# Patient Record
Sex: FEMALE | Employment: OTHER | URBAN - METROPOLITAN AREA
[De-identification: names, ages, dates, MRNs, and addresses within clinical notes are randomized per-mention and may not be internally consistent; named-entity substitution may affect disease eponyms.]

---

## 2022-02-24 ENCOUNTER — HOSPITAL ENCOUNTER (OUTPATIENT)
Dept: PHYSICAL THERAPY | Age: 22
Discharge: HOME | End: 2022-02-24
Payer: OTHER GOVERNMENT

## 2022-02-24 PROCEDURE — 97530 THERAPEUTIC ACTIVITIES: CPT

## 2022-02-24 PROCEDURE — 97112 NEUROMUSCULAR REEDUCATION: CPT

## 2022-02-24 PROCEDURE — 97161 PT EVAL LOW COMPLEX 20 MIN: CPT

## 2022-02-24 NOTE — PROGRESS NOTES
[facility]  [place] [email or web address]  [address] [place] [address]  [phone] fax    Plan of Care/ Statement of Necessity for Physical Therapy Services    Patient name: Mohit Regalado Start of Care: 2022   Referral source: Thad Peace,* : 2000    Medical Diagnosis: Pelvic floor dysfunction [M62.89]  Urge incontinence [N39.41]  Payor: [facility] / Plan: [facility] / Product Type: Veleta Del /  Onset Date: Aug 2021    Treatment Diagnosis: PFD, abdominal pain, Urge UI   Prior Hospitalization: see medical history Provider#: [id number]   Medications: Verified on Patient summary List    Comorbidities: alcohol use   Prior Level of Function: ind with all mobility, heavy lifting at work (90 lbs repetitively throughout the day)           The Plan of Care and following information is based on the information from the initial evaluation. Assessment/ key information: Ms. Mohit Regalado is a 20 y/o, F who present with c/o PFD, abdominal pain, and urge UI. Pain/problem started in , aggravated significantly in Aug 2021. Pt recalled feet surgery in Mar 2021. Pain locates at lower abdominal region and pelvic/vulva region. Pain aggravated with urgency, lifting, and squatting (which are required for job). No pain with internal assessment and during sexual intercourse; mod-max pain after though. Pt reports difficulty with maintaining optimal voiding interval and mechanics (due to job duty). Pt reports few drop leakage with changing position and strong urgency (delaying voiding bladder more than 4 hour). Pt needs to void bladder every 1 hour during off day/when she's at home. Pt reports no significant problem with bowel.  Evaluation reveals patient with musculoskeletal screen TTP along lower abdominal region, worst with bladder region, poor strength and max challenge with relaxation of PFM. No instability or trigger points noted.  Patient may benefit from physical therapy to address these limitations to improve her QOL. Evaluation Complexity History MEDIUM  Complexity : 1-2 comorbidities / personal factors will impact the outcome/ POC ; Examination LOW Complexity : 1-2 Standardized tests and measures addressing body structure, function, activity limitation and / or participation in recreation  ;Presentation LOW Complexity : Stable, uncomplicated  ;Clinical Decision Making MEDIUM Complexity : FOTO score of 26-74  Overall Complexity Rating: LOW     Problem List: Pelvic pain/dysfunction, Decreased pelvic floor mm awareness, Decreased pelvic floor mm strength, Use of accessory muscles, Improper voiding habits, Hypertonus of pelvic floor and Urinary urgency    Treatment Plan may include any combination of the following:   Therapeutic exercise, Urge suppression techniques, Neuromuscular re-education, Manual therapy, Physical agent/modality and Patient education  Patient / Family readiness to learn indicated by: asking questions, trying to perform skills and interest    Persons(s) to be included in education: patient (P)    Barriers to Learning/Limitations: None    Patient Goal (s): better help with dealing with pelvic discomfort/pain    Patient Self Reported Health Status: good    Rehabilitation Potential: good      Short Term Goals: To be accomplished in 4 weeks:  1. Patient will demonstrate accurate performance of home exercise program/pelvic floor contractions as adjunct to physical therapy clinic visits to promote healthy lifestyle and improved quality of life. Eval status: will review next visits     2. Patient will Complete Bladder Diary for use in patient education and goal setting. Eval status: will be given for monitoring     3. Patient will demonstrate 2-3 hour voiding interval with no leakage. Eval status: 1-4+ hour voiding interval depending on work & personal schedule     4.  Patient will report at least 25% improvement with pain to improve her QOL.  Eval status: 3-9/10, worst with delaying viding bladder, squatting/lifting      5. Patient will demonstrate good mechanics with squatting and lifting to improve ease & safety with job duty. Eval status: good understanding     Long Term Goals: To be accomplished in 8  weeks:  1. Patient will demonstrate independence in HEP for maintenance of pelvic floor program and improved quality of life. Eval status: will review next visit     2. Patient will report at least 60% improvement with ease for voiding bladder to improve her QOL. Eval status: required extra time, urgency return 10 min after voiding     3. Patient will report at least 60% improvement with pain to improve her QOL. Eval status: 3-9/10, worst with delaying viding bladder, squatting/lifting      4. Patient will have increased pelvic floor muscle motor performance by 1/2 to 1 grade for improved urinary continence and quality of life. Eval status: 1/5     5. Patient will have FOTO Urinary Problem & PFDI pain score change of 16 points or more indicating improvement in function for icreased quality of life. Eval status: 55 & 29 respectively       Frequency / Duration: Patient to be seen 1-2 times per week for 8 weeks. Patient/ Caregiver education and instruction: Diagnosis, prognosis, Proper Voiding Habits, Diet, Pain Management, Exercises and Bladder Retraining      Jaleesa Valenzuela  2/24/2022 9:45 AM    ________________________________________________________________________    I certify that the above Therapy Services are being furnished while the patient is under my care. I agree with the treatment plan and certify that this therapy is necessary.     Physician's Signature:____________Date:_________TIME:________     Francesco Quispe,*  ** Signature, Date and Time must be completed for valid certification **      Please sign and return to [facility] [employer] [facility]  [facility]   [address] [place] [address]  [phone] fax

## 2022-02-24 NOTE — PROGRESS NOTES
PF Daily Treatment Note  Patient Name: Mohit Regalado  Date:2022  []  Patient  Verified  Insurance:Payor:  / Plan: [facility] / Product Type:  /   In time: 8:16  Out time: 9:03  Total Treatment Time (min): 47  Total Timed Codes (min): 30  1:1 Treatment Time ( only): 52   Visit #:  8 of 16    Treatment Area: [x] Pelvic Floor     [] Other:    SUBJECTIVE  Pain Level (0-10 scale): 6/10  Any medication changes, allergies to medications, adverse drug reactions, diagnosis change, or new procedure performed?: [x] No    [] Yes (see summary sheet for update)    Ms. Mohit Regalado is a 20 y/o, F who present with c/o PFD, abdominal pain, and urge UI. Pain/problem started , aggravated significantly in Aug 2021. Pt recalled feet surgery in Mar 2021. Pain locates at lower abdominal region and pelvic/vulva region. Pain aggravated with urgency lifting, and squatting (which are required for job). No pain with internal assessment and during sexual intercourse; mod-max pain after though. Pt reports difficulty with maintaining optimal voiding interval and mechanics due to job duty. Pt reports few drop leakage with changing position and strong urgency (delaying voiding bladder more than 4 hour). Pt needs to void bladder every 1 hour during off day/when she's at home. Pt reports no significant problem with bowel. Pelvic Floor Dysfunction Evaluation    Musculoskeletal Screen:    Skin Integrity:  [] Healthy [] Red  [x] Labia Atrophy [] Fragile    Sensation: [x] Intact [] Diminished:    Muscle Bulk: [x] Symmetrical  [] Well-developed [] Atrophied:  []L   []R   []B    Prolapse: none [] Cystocele:   [] Rectocele:    PERF Score (Performance/Endurance/Repetitions/Flicks)   P: 1 E: R: F: Total:    Patient has failed previous pelvic floor muscle training?   [] Yes    [] No    EMG Evaluation:  [] N/A [] Deferred secondary to:    Channel A: Electrode type:  [] Internal    [] Surface    [] Vaginal [] Rectal  Channel B: Electrode location:    Baseline Resting Tone (1 minute)  Channel A (microvolts): Quality:  [] Normal [] Irradic [] Elevated  Channel B (microvolts): Slow Twitch: (10 second hold, 20 second rest)  Channel A (microvolts): Quality:[] Quick/slow rise [] Low net rise  Net rise (microvolts):   [] Slow Relaxation [] Incoordination       [] Unable to contract [] Fatigues at (sec):       [] Elevated baseline between contractions    Channel B (microvolts): Use of Accessory Muscles: [] Minimal  Net rise (microvolts):   [] Moderate  [] Excessive       [] No use of accessory muscles    Fast Twitch (3 second hold, 10 second rest)  Channel A (microvolts): Quality:[] Quick/slow rise [] Low net rise  Net rise (microvolts):   [] Slow Relaxation [] Incoordination       [] Unable to contract [] Fatigues at (sec):       [] Elevated baseline between contractions    Channel B (microvolts):  Use of Accessory Muscles: [] Minimal  Net rise (microvolts):   [] Moderate  [] Excessive       [] No use of accessory muscles    Optional Tests:  Lower abdominal strength: /5    Comments/Additional Tests:      OBJECTIVE    Modality rationale: decrease pain and increase tissue extensibility to improve the patients ability to tolerate ADLs/job duties   Min Type Additional Details    [] Estim: []Att   []Unatt        []TENS instruct                  []IFC  []Premod   []NMES                    []Other:  []w/US   []w/ice   []w/heat  Position:  Location:    []  Ultrasound: []Continuous   [] Pulsed                           []1MHz   []3MHz Location:  W/cm2:   15 during eval []  Ice     [x]  heat  []  Ice massage Position: sitting  Location: abdominal region   [x] Skin assessment post-treatment:  [x]intact []redness- no adverse reaction        []redness  adverse reaction:     17 min eval    13 min Therapeutic Activity:  []  See flow sheet :Pt edu within scope of practice on prognosis, POC, PFM anatomy/physiology, pain management, modalities use including TENS, lifting mechanics, HEP review, healthy function of bladder and bowel. Rationale: Increase pelvic floor muscle strength, Improve quality of pelvic floor contractions, Decrease resting tone of the pelvic floor, Increase tissue extensibility of the pelvic floor muscles, Increase core strength, Inhibit abnormal muscle activity and Improve lumbosacral and coccygeal mobility in order to Increase urinary continence, Decrease urinary urgency, Improve frequency and ease of bowel movements and Improve ability to perform ADLs. 10 min Neuromuscular Re-education:  []  See flow sheet : voiding mechanics, quality PFM contraction   Rationale: Increase pelvic floor muscle strength, Improve quality of pelvic floor contractions, Decrease resting tone of the pelvic floor, Increase tissue extensibility of the pelvic floor muscles, Increase core strength, Inhibit abnormal muscle activity and Improve lumbosacral and coccygeal mobility in order to Increase urinary continence, Decrease urinary urgency, Improve frequency and ease of bowel movements and Improve ability to perform ADLs.            min Patient Education: [x] Review HEP    [] Progressed/Changed HEP based on:   [] positioning   [] body mechanics   [] transfers   [] heat/ice application        Other Objective/Functional Measures:   []baseline resting tone:   []slow twitch mms   []fast twitch mms    Pain Level (0-10 scale) post treatment: 5/10    ASSESSMENT/Changes in Function: see POC    []  Decrease # of leaks   [] No change []  Improving [] Resolved     []  Decrease hypertonus [] No change []  Improving [] Resolved     []  Increase void interval [] No change []  Improving [] Resolved     []  Increase PF strength [] No change []  Improving [] Resolved     []  Increase PF endurance [] No change []  Improving [] Resolved     []  Increase endurance [] No change []  Improving [] Resolved     []  Decrease # of pads [] No change []  Improving [] Resolved     []  Decrease pain [] No change []  Improving [] Resolved       Patient will continue to benefit from skilled PT services to modify and progress therapeutic interventions, address functional mobility deficits, address ROM deficits, address strength deficits, analyze and address soft tissue restrictions, analyze and cue movement patterns, analyze and modify body mechanics/ergonomics, assess and modify postural abnormalities and instruct in home and community integration to attain remaining goals.      [x]  See Plan of Care         PLAN  [x]  Upgrade activities as tolerated     [x]  Continue plan of care  []  Update interventions per flow sheet       []  Discharge due to:_  []  Other:_      Jae Parnell 2/24/2022  8:16 AM

## 2022-03-02 ENCOUNTER — HOSPITAL ENCOUNTER (OUTPATIENT)
Dept: PHYSICAL THERAPY | Age: 22
Discharge: HOME | End: 2022-03-02
Payer: OTHER GOVERNMENT

## 2022-03-02 PROCEDURE — 97530 THERAPEUTIC ACTIVITIES: CPT

## 2022-03-02 PROCEDURE — 97112 NEUROMUSCULAR REEDUCATION: CPT

## 2022-03-02 PROCEDURE — 97110 THERAPEUTIC EXERCISES: CPT

## 2022-03-02 NOTE — PROGRESS NOTES
PF DAILY TREATMENT NOTE 3-16    Patient Name: Viviana John  Date:3/2/2022  : 2000  [x]  Patient  Verified  Payor: ÁNGEL / Plan: Shawn Campos 72 / Product Type:  /    In time: 2:19  Out time:3:03  Total Treatment Time (min): 44  Visit #: 2 of 16    Treatment Area: [x] Pelvic Floor     [] Other:    SUBJECTIVE  Pain Level (0-10 scale): 0/10  Any medication changes, allergies to medications, adverse drug reactions, diagnosis change, or new procedure performed?: [x] No    [] Yes (see summary sheet for update)  Subjective functional status/changes:   [] No changes reported  Pt felt recently; landing on her back and her Left arm. She has a lot of pain with her Left forearm. Pt has been able to void her bladder more frequently at work thus her abdominal pain is a little better. She usually has urgency about 1.5-2 hour but sometimes has to delay to 5+ hour. Her urgency has been a little worst due to active menstrual cycle. Pt also takes cranberry tablets routinely to prevent UTI. OBJECTIVE      8 min Therapeutic Exercise:  [] See flow sheet :   []  Pelvic floor strengthening                 []  Pelvic floor downtraining  []  Quality pelvic floor contractions       [x]  Relaxation techniques  []  Urge suppression exercises  []  Other:  Rationale: Decrease resting tone of the pelvic floor, Increase tissue extensibility of the pelvic floor muscles, Inhibit abnormal muscle activity and Improve lumbosacral and coccygeal mobility in order to Increase urinary continence, Decrease urinary urgency and Improve ability to perform ADLs.        26 min Therapeutic Activity:  [x]  See flow sheet :    []  Increase Tissue extensibility        []  Assess fiber intake    [x]  Assess voiding habits  [x]  Assess bowel habits  [x]  Other: voiding interval, healthy bowel & bladder function, fluid intake   Rationale: Increase pelvic floor muscle strength, Improve quality of pelvic floor contractions, Decrease resting tone of the pelvic floor, Increase tissue extensibility of the pelvic floor muscles, Increase core strength, Inhibit abnormal muscle activity and Improve lumbosacral and coccygeal mobility in order to Increase urinary continence, Decrease urinary urgency, Increase ability to delay urination, Decrease frequency of urination, Decrease nocturia, Increase fecal continence and Improve ability to perform ADLs. 10 min Neuromuscular Re-education:  []  See flow sheet :   []  Pelvic floor strengthening                 []  Pelvic floor downtraining  [x]  Quality pelvic floor contractions       []  Relaxation techniques  [x]  Urge suppression exercises  []  Other:  Rationale: Increase pelvic floor muscle strength, Improve quality of pelvic floor contractions, Decrease resting tone of the pelvic floor, Increase tissue extensibility of the pelvic floor muscles, Increase core strength, Inhibit abnormal muscle activity and Improve lumbosacral and coccygeal mobility in order to Increase urinary continence, Decrease urinary urgency, Increase ability to delay urination, Decrease frequency of urination, Decrease nocturia, Increase fecal continence and Improve ability to perform ADLs. With   [] TE   [] TA   [] neuro  [] manual   [] other: Patient Education: [x] Review HEP    [] Progressed/Changed HEP based on:   [] positioning   [] body mechanics   [] transfers   [] heat/ice application    [] other:      Other Objective/Functional Measures:   []baseline resting tone:   []slow twitch mms   []fast twitch mms    Pain Level (0-10 scale) post treatment: 0/10    ASSESSMENT/Changes in Function: pt reports fairly normal urgency with bladder but has to delay voiding due to work schedule. She demonstrates improved understanding with optimal bladder & bowel function, improved coordination/awareness of PFM after education/review today. Will progress with Biofeedback and update HEP as tolerated.      []  Decrease # of leaks [] No change []  Improving [] Resolved     []  Decrease hypertonus [] No change []  Improving [] Resolved     []  Increase void interval [] No change []  Improving [] Resolved     []  Increase PF strength [] No change []  Improving [] Resolved     []  Increase PF endurance [] No change []  Improving [] Resolved     []  Increase endurance [] No change []  Improving [] Resolved     []  Decrease # of pads [] No change []  Improving [] Resolved     []  Decrease pain [] No change []  Improving [] Resolved     []  Increased coordination [] No change []  Improving [] Resolved     []  Increased Bowel Frequency [] No change []  Improving [] Resolved       Patient will continue to benefit from skilled PT services to modify and progress therapeutic interventions, address functional mobility deficits, address ROM deficits, address strength deficits, analyze and address soft tissue restrictions, analyze and cue movement patterns, analyze and modify body mechanics/ergonomics, assess and modify postural abnormalities and instruct in home and community integration to attain remaining goals. []  See Plan of Care  [x]  See progress note/recertification  []  See Discharge Summary         Progress towards goals / Updated goals:  Short Term Goals: To be accomplished in 4 weeks:  1. Patient will demonstrate accurate performance of home exercise program/pelvic floor contractions as adjunct to physical therapy clinic visits to promote healthy lifestyle and improved quality of life. Eval status: will review next visits     2. Patient will Complete Bladder Diary for use in patient education and goal setting. Eval status: will be given for monitoring     3. Patient will demonstrate 2-3 hour voiding interval with no leakage.   Eval status: 1-4+ hour voiding interval depending on work & personal schedule  Current: experiences urgency ~2 hour but usually needs to delay voiding for 2-3 hours 3-2-22    4.  Patient will report at least 25% improvement with pain to improve her QOL. Eval status: 3-9/10, worst with delaying viding bladder, squatting/lifting   Current: min improvement with improved voiding frequency 3-2-22     5. Patient will demonstrate good mechanics with squatting and lifting to improve ease & safety with job duty. Eval status: good understanding     Long Term Goals: To be accomplished in 8  weeks:  1. Patient will demonstrate independence in [facility] for maintenance of pelvic floor program and improved quality of life. Eval status: will review next visit     2. Patient will report at least 60% improvement with ease for voiding bladder to improve her QOL. Eval status: required extra time, urgency return 10 min after voiding     3. Patient will report at least 60% improvement with pain to improve her QOL. Eval status: 3-9/10, worst with delaying viding bladder, squatting/lifting      4. Patient will have increased pelvic floor muscle motor performance by 1/2 to 1 grade for improved urinary continence and quality of life. Eval status: 1/5     5. Patient will have FOTO Urinary Problem & PFDI pain score change of 16 points or more indicating improvement in function for icreased quality of life.   Eval status: 55 & 29 respectively    PLAN  [x]  Upgrade activities as tolerated     [x]  Continue plan of care  []  Update interventions per flow sheet       []  Discharge due to:_  []  Other:_      Jae Parnell 3/2/2022  8:02 AM    Future Appointments   Date Time Provider Sarah Vela   3/2/2022  2:15 PM Jayleen Gallegos MMCPTPB [facility]   3/4/2022 11:15 AM Faye LUGO [facility]TP[facility]   3/7/2022 10:30 AM Jayleen Gallegos MMCPTP[facility]   3/10/2022  9:45 AM Jayleen Gallegos [email or web address] [facility]   3/14/2022 10:30 AM Jayleen Gallegos [email or web address] [facility]   3/16/2022 11:15 AM Jayleen Gallegos [id number] [facility]

## 2022-03-04 ENCOUNTER — HOSPITAL ENCOUNTER (OUTPATIENT)
Dept: PHYSICAL THERAPY | Age: 22
Discharge: HOME | End: 2022-03-04
Payer: OTHER GOVERNMENT

## 2022-03-04 PROCEDURE — 97110 THERAPEUTIC EXERCISES: CPT

## 2022-03-04 PROCEDURE — 97112 NEUROMUSCULAR REEDUCATION: CPT

## 2022-03-04 PROCEDURE — 97140 MANUAL THERAPY 1/> REGIONS: CPT

## 2022-03-04 NOTE — PROGRESS NOTES
PF DAILY TREATMENT NOTE 3-16    Patient Name: Adore Gordon  Date:3/4/2022  : 2000  [x]  Patient  Verified  Payor: ÁNGEL / Plan: Shawn Campos 72 / Product Type:  /    In time: 11:20  Out time: 11:59  Total Treatment Time (min): 39  Visit #: 3 of 16    Treatment Area: [x] Pelvic Floor     [] Other:    SUBJECTIVE  Pain Level (0-10 scale): 0/10  Any medication changes, allergies to medications, adverse drug reactions, diagnosis change, or new procedure performed?: [x] No    [] Yes (see summary sheet for update)  Subjective functional status/changes:   [] No changes reported  Pt will start training next week and won't be back for 1 month. She reports doing ok with exercises and urge management. OBJECTIVE    8 min Therapeutic Exercise:  [] See flow sheet :   []  Pelvic floor strengthening                 [x]  Pelvic floor downtraining  []  Quality pelvic floor contractions       [x]  Relaxation techniques  []  Urge suppression exercises  []  Other:  Rationale: Decrease resting tone of the pelvic floor, Increase tissue extensibility of the pelvic floor muscles, Inhibit abnormal muscle activity and Improve lumbosacral and coccygeal mobility in order to Increase urinary continence, Decrease urinary urgency, Increase ability to delay urination, Decrease frequency of urination and Improve ability to perform ADLs.       23 min Neuromuscular Re-education:  []  See flow sheet :   []  Pelvic floor strengthening                 [x]  Pelvic floor downtraining  [x]  Quality pelvic floor contractions       [x]  Relaxation techniques  []  Urge suppression exercises  []  Other:  Decrease resting tone of the pelvic floor, Increase tissue extensibility of the pelvic floor muscles, Inhibit abnormal muscle activity and Improve lumbosacral and coccygeal mobility in order to Increase urinary continence, Decrease urinary urgency, Increase ability to delay urination, Decrease frequency of urination and Improve ability to perform ADLs. 8 min Manual Therapy:  Abdominal MFR, bladder mobilization   The manual therapy interventions were performed at a separate and distinct time from the therapeutic activities interventions. Rationale: Decrease resting tone of the pelvic floor, Increase tissue extensibility of the pelvic floor muscles, Inhibit abnormal muscle activity and Improve lumbosacral and coccygeal mobility in order to Increase urinary continence, Decrease urinary urgency, Increase ability to delay urination, Decrease frequency of urination, Decrease nocturia and Improve ability to perform ADLs. With   [] TE   [] TA   [] neuro  [] manual   [] other: Patient Education: [x] Review HEP    [] Progressed/Changed HEP based on:   [] positioning   [] body mechanics   [] transfers   [] heat/ice application    [] other:      Other Objective/Functional Measures:   []baseline resting tone:   []slow twitch mms   []fast twitch mms    Pain Level (0-10 scale) post treatment: 0/10    ASSESSMENT/Changes in Function: Pt demonstrates good understanding with urge management, good form and reports good relaxation with all activities today. She will be out of town until 4-8-22 due to job training. Will cont to progress manual and therex as tolerated.      []  Decrease # of leaks   [] No change []  Improving [] Resolved     []  Decrease hypertonus [] No change []  Improving [] Resolved     []  Increase void interval [] No change []  Improving [] Resolved     []  Increase PF strength [] No change []  Improving [] Resolved     []  Increase PF endurance [] No change []  Improving [] Resolved     []  Increase endurance [] No change []  Improving [] Resolved     []  Decrease # of pads [] No change []  Improving [] Resolved     []  Decrease pain [] No change []  Improving [] Resolved     []  Increased coordination [] No change []  Improving [] Resolved     []  Increased Bowel Frequency [] No change []  Improving [] Resolved       Patient will continue to benefit from skilled PT services to modify and progress therapeutic interventions, address functional mobility deficits, address ROM deficits, address strength deficits, analyze and address soft tissue restrictions, analyze and cue movement patterns, analyze and modify body mechanics/ergonomics, assess and modify postural abnormalities and instruct in home and community integration to attain remaining goals. [x]  See Plan of Care  []  See progress note/recertification  []  See Discharge Summary         Progress towards goals / Updated goals:  Short Term Goals: To be accomplished in 4 weeks:  1. Patient will demonstrate accurate performance of home exercise program/pelvic floor contractions as adjunct to physical therapy clinic visits to promote healthy lifestyle and improved quality of life. Eval status: will review next visits     2. Patient will Complete Bladder Diary for use in patient education and goal setting. Eval status: will be given for monitoring     3. Patient will demonstrate 2-3 hour voiding interval with no leakage.   Eval status: 1-4+ hour voiding interval depending on work & personal schedule  Current: experiences urgency ~2 hour but usually needs to delay voiding for 2-3 hours 3-2-22     4. Patient will report at least 25% improvement with pain to improve her QOL. Eval status: 3-9/10, worst with delaying viding bladder, squatting/lifting   Current: min improvement with improved voiding frequency 3-2-22     5. Patient will demonstrate good mechanics with squatting and lifting to improve ease & safety with job duty. Eval status: good understanding     Long Term Goals: To be accomplished in 8  weeks:  1. Patient will demonstrate independence in HEP for maintenance of pelvic floor program and improved quality of life. Eval status: will review next visit     2.  Patient will report at least 60% improvement with ease for voiding bladder to improve her QOL.  Eval status: required extra time, urgency return 10 min after voiding     3. Patient will report at least 60% improvement with pain to improve her QOL. Eval status: 3-9/10, worst with delaying viding bladder, squatting/lifting      4. Patient will have increased pelvic floor muscle motor performance by 1/2 to 1 grade for improved urinary continence and quality of life. Eval status: 1/5     5. Patient will have FOTO Urinary Problem & PFDI pain score change of 16 points or more indicating improvement in function for icreased quality of life.   Eval status: 55 & 29 respectively    PLAN  [x]  Upgrade activities as tolerated     [x]  Continue plan of care  []  Update interventions per flow sheet       []  Discharge due to:_  []  Other:_      Cortez Constantino 3/4/2022  8:14 AM    Future Appointments   Date Time Provider Sarah Vela   3/4/2022 11:15 AM Stan Mak [facility]CP[facility]   3/7/2022 10:30 AM Stan Mak [facility]   3/10/2022  9:45 AM Jaleesa Sandoval [id number] [facility]   3/14/2022 10:30 AM Stan Mak CHYPOJS [facility]   3/16/2022 11:15 AM Stan Mak [id number] [facility]

## 2022-03-07 ENCOUNTER — HOSPITAL ENCOUNTER (OUTPATIENT)
Dept: PHYSICAL THERAPY | Age: 22
End: 2022-03-07
Payer: OTHER GOVERNMENT

## 2022-03-10 ENCOUNTER — APPOINTMENT (OUTPATIENT)
Dept: PHYSICAL THERAPY | Age: 22
End: 2022-03-10
Payer: OTHER GOVERNMENT

## 2022-03-14 ENCOUNTER — APPOINTMENT (OUTPATIENT)
Dept: PHYSICAL THERAPY | Age: 22
End: 2022-03-14
Payer: OTHER GOVERNMENT

## 2022-03-16 ENCOUNTER — APPOINTMENT (OUTPATIENT)
Dept: PHYSICAL THERAPY | Age: 22
End: 2022-03-16
Payer: OTHER GOVERNMENT

## 2022-04-11 ENCOUNTER — TELEPHONE (OUTPATIENT)
Dept: PHYSICAL THERAPY | Age: 22
End: 2022-04-11

## 2022-04-14 NOTE — PROGRESS NOTES
[facility]  [address] [place] [address]  [phone] fax    Physical Therapy Discharge Summary      Patient name: Pooja Khan Start of Care: 2022   Referral source: Guera Pettit,* : 2000               Medical Diagnosis: Pelvic floor dysfunction [M62.89]  Urge incontinence [N39.41]  Payor: [facility] / Plan: [facility] / Product Type: Tete Ades /  Onset Date: Aug 2021               Treatment Diagnosis: PFD, abdominal pain, Urge UI   Prior Hospitalization: see medical history Provider#: [id number]   Medications: Verified on Patient summary List    Comorbidities: alcohol use   Prior Level of Function: ind with all mobility, heavy lifting at work (90 lbs repetitively throughout the day)        Visits from Start of Care: 3    Missed Visits: 0    Reporting Period : 22 to 3-4-22    Summary of Care:  Short Term Goals: To be accomplished in 4 weeks:  1. Patient will demonstrate accurate performance of home exercise program/pelvic floor contractions as adjunct to physical therapy clinic visits to promote healthy lifestyle and improved quality of life. Eval status: will review next visits  Status at discharge: not met, unplanned discharge     2. Patient will Complete Bladder Diary for use in patient education and goal setting. Eval status: will be given for monitoring  Status at discharge: not met, unplanned discharge     3. Patient will demonstrate 2-3 hour voiding interval with no leakage.   Eval status: 1-4+ hour voiding interval depending on work & personal schedule  Current: experiences urgency ~2 hour but usually needs to delay voiding for 2-3 hours 3-2-22  Status at discharge: not met, unplanned discharge     4. Patient will report at least 25% improvement with pain to improve her QOL.   Eval status: 3-9/10, worst with delaying viding bladder, squatting/lifting   Current: min improvement with improved voiding frequency 3-2-22  Status at discharge: not met, unplanned discharge     5. Patient will demonstrate good mechanics with squatting and lifting to improve ease & safety with job duty. Eval status: good understanding  Status at discharge: not met, unplanned discharge     Long Term Goals: To be accomplished in 8  weeks:  1. Patient will demonstrate independence in HEP for maintenance of pelvic floor program and improved quality of life. Eval status: will review next visit  Status at discharge: not met, unplanned discharge     2. Patient will report at least 60% improvement with ease for voiding bladder to improve her QOL. Eval status: required extra time, urgency return 10 min after voiding  Status at discharge: not met, unplanned discharge     3. Patient will report at least 60% improvement with pain to improve her QOL. Eval status: 3-9/10, worst with delaying viding bladder, squatting/lifting   Status at discharge: not met, unplanned discharge    4. Patient will have increased pelvic floor muscle motor performance by 1/2 to 1 grade for improved urinary continence and quality of life. Eval status: 1/5  Status at discharge: not met, unplanned discharge    5. Patient will have FOTO Urinary Problem & PFDI pain score change of 16 points or more indicating improvement in function for icreased quality of life. Eval status: 55 & 29 respectively  Status at discharge: not met, unplanned discharge      ASSESSMENT/RECOMMENDATIONS: Pt was unable to cont treatment due to work & personal schedule.      [x]Discontinue therapy: []Patient has reached or is progressing toward set goals      [x]Patient is non-compliant or has abdicated      []Due to lack of appreciable progress towards set goals    Jaleesa Valenzuela Sheldon 4/14/2022 8:34 AM